# Patient Record
Sex: FEMALE | Race: WHITE | NOT HISPANIC OR LATINO | URBAN - METROPOLITAN AREA
[De-identification: names, ages, dates, MRNs, and addresses within clinical notes are randomized per-mention and may not be internally consistent; named-entity substitution may affect disease eponyms.]

---

## 2018-04-30 ENCOUNTER — OFFICE (OUTPATIENT)
Dept: URBAN - METROPOLITAN AREA CLINIC 78 | Facility: CLINIC | Age: 53
End: 2018-04-30

## 2018-04-30 VITALS
DIASTOLIC BLOOD PRESSURE: 64 MMHG | TEMPERATURE: 98.1 F | HEART RATE: 74 BPM | SYSTOLIC BLOOD PRESSURE: 119 MMHG | WEIGHT: 192 LBS | HEIGHT: 65 IN

## 2018-04-30 DIAGNOSIS — D50.9 IRON DEFICIENCY ANEMIA, UNSPECIFIED: ICD-10-CM

## 2018-04-30 DIAGNOSIS — K74.69 OTHER CIRRHOSIS OF LIVER: ICD-10-CM

## 2018-04-30 PROCEDURE — 99204 OFFICE O/P NEW MOD 45 MIN: CPT

## 2018-04-30 RX ORDER — LACTULOSE 10 G/15ML
600 SOLUTION ORAL
Qty: 1800 | Refills: 2 | Status: ACTIVE

## 2018-04-30 RX ORDER — FUROSEMIDE 20 MG/1
TABLET ORAL
Qty: 90 | Refills: 3 | Status: ACTIVE

## 2018-04-30 RX ORDER — ONDANSETRON HYDROCHLORIDE 4 MG/1
16 TABLET, FILM COATED ORAL
Qty: 45 | Refills: 2 | Status: ACTIVE
Start: 2018-04-30

## 2018-06-20 ENCOUNTER — ON CAMPUS - OUTPATIENT (OUTPATIENT)
Dept: URBAN - METROPOLITAN AREA HOSPITAL 63 | Facility: HOSPITAL | Age: 53
End: 2018-06-20
Payer: MEDICAID

## 2018-06-20 DIAGNOSIS — K76.6 PORTAL HYPERTENSION: ICD-10-CM

## 2018-06-20 DIAGNOSIS — I85.00 ESOPHAGEAL VARICES WITHOUT BLEEDING: ICD-10-CM

## 2018-06-20 DIAGNOSIS — D50.9 IRON DEFICIENCY ANEMIA, UNSPECIFIED: ICD-10-CM

## 2018-06-20 DIAGNOSIS — K74.60 UNSPECIFIED CIRRHOSIS OF LIVER: ICD-10-CM

## 2018-06-20 LAB
ANA SCREEN, IFA, W/REFL TITER AND PATTERN: ANA SCREEN, IFA: NEGATIVE
CBC (INCLUDES DIFF/PLT): ABSOLUTE BAND NEUTROPHILS: NORMAL CELLS/UL
CBC (INCLUDES DIFF/PLT): ABSOLUTE BASOPHILS: 19 CELLS/UL (ref 0–200)
CBC (INCLUDES DIFF/PLT): ABSOLUTE BLASTS: NORMAL CELLS/UL
CBC (INCLUDES DIFF/PLT): ABSOLUTE EOSINOPHILS: 38 CELLS/UL (ref 15–500)
CBC (INCLUDES DIFF/PLT): ABSOLUTE LYMPHOCYTES: 637 CELLS/UL — LOW (ref 850–3900)
CBC (INCLUDES DIFF/PLT): ABSOLUTE METAMYELOCYTES: NORMAL CELLS/UL
CBC (INCLUDES DIFF/PLT): ABSOLUTE MONOCYTES: 330 CELLS/UL (ref 200–950)
CBC (INCLUDES DIFF/PLT): ABSOLUTE MYELOCYTES: NORMAL CELLS/UL
CBC (INCLUDES DIFF/PLT): ABSOLUTE NEUTROPHILS: 2176 CELLS/UL (ref 1500–7800)
CBC (INCLUDES DIFF/PLT): ABSOLUTE NUCLEATED RBC: NORMAL CELLS/UL
CBC (INCLUDES DIFF/PLT): ABSOLUTE PROMYELOCYTES: NORMAL CELLS/UL
CBC (INCLUDES DIFF/PLT): BAND NEUTROPHILS: NORMAL %
CBC (INCLUDES DIFF/PLT): BASOPHILS: 0.6 %
CBC (INCLUDES DIFF/PLT): BLASTS: NORMAL %
CBC (INCLUDES DIFF/PLT): COMMENT(S): NORMAL
CBC (INCLUDES DIFF/PLT): EOSINOPHILS: 1.2 %
CBC (INCLUDES DIFF/PLT): HEMATOCRIT: 32.8 % — LOW (ref 35–45)
CBC (INCLUDES DIFF/PLT): HEMOGLOBIN: 10.6 G/DL — LOW (ref 11.7–15.5)
CBC (INCLUDES DIFF/PLT): LYMPHOCYTES: 19.9 %
CBC (INCLUDES DIFF/PLT): MCH: 29.1 PG (ref 27–33)
CBC (INCLUDES DIFF/PLT): MCHC: 32.3 G/DL (ref 32–36)
CBC (INCLUDES DIFF/PLT): MCV: 90.1 FL (ref 80–100)
CBC (INCLUDES DIFF/PLT): METAMYELOCYTES: NORMAL %
CBC (INCLUDES DIFF/PLT): MONOCYTES: 10.3 %
CBC (INCLUDES DIFF/PLT): MPV: 12.6 FL — HIGH (ref 7.5–12.5)
CBC (INCLUDES DIFF/PLT): MYELOCYTES: NORMAL %
CBC (INCLUDES DIFF/PLT): NEUTROPHILS: 68 %
CBC (INCLUDES DIFF/PLT): NUCLEATED RBC: NORMAL /100 WBC
CBC (INCLUDES DIFF/PLT): PLATELET COUNT: 100 THOUSAND/UL — LOW (ref 140–400)
CBC (INCLUDES DIFF/PLT): PROMYELOCYTES: NORMAL %
CBC (INCLUDES DIFF/PLT): RDW: 16.6 % — HIGH (ref 11–15)
CBC (INCLUDES DIFF/PLT): REACTIVE LYMPHOCYTES: NORMAL %
CBC (INCLUDES DIFF/PLT): RED BLOOD CELL COUNT: 3.64 MILLION/UL — LOW (ref 3.8–5.1)
CBC (INCLUDES DIFF/PLT): WHITE BLOOD CELL COUNT: 3.2 THOUSAND/UL — LOW (ref 3.8–10.8)
COMPREHENSIVE METABOLIC PANEL: ALBUMIN/GLOBULIN RATIO: 0.9 (CALC) — LOW (ref 1–2.5)
COMPREHENSIVE METABOLIC PANEL: ALBUMIN: 3.5 G/DL — LOW (ref 3.6–5.1)
COMPREHENSIVE METABOLIC PANEL: ALKALINE PHOSPHATASE: 231 U/L — HIGH (ref 33–130)
COMPREHENSIVE METABOLIC PANEL: ALT: 63 U/L — HIGH (ref 6–29)
COMPREHENSIVE METABOLIC PANEL: AST: 72 U/L — HIGH (ref 10–35)
COMPREHENSIVE METABOLIC PANEL: BILIRUBIN, TOTAL: 1.1 MG/DL (ref 0.2–1.2)
COMPREHENSIVE METABOLIC PANEL: BUN/CREATININE RATIO: (no result) (CALC)
COMPREHENSIVE METABOLIC PANEL: CALCIUM: 9.2 MG/DL (ref 8.6–10.4)
COMPREHENSIVE METABOLIC PANEL: CARBON DIOXIDE: 32 MMOL/L — HIGH (ref 20–31)
COMPREHENSIVE METABOLIC PANEL: CHLORIDE: 105 MMOL/L (ref 98–110)
COMPREHENSIVE METABOLIC PANEL: CREATININE: 0.65 MG/DL (ref 0.5–1.05)
COMPREHENSIVE METABOLIC PANEL: EGFR AFRICAN AMERICAN: 120 ML/MIN/1.73M2 (ref 60–?)
COMPREHENSIVE METABOLIC PANEL: EGFR NON-AFR. AMERICAN: 104 ML/MIN/1.73M2 (ref 60–?)
COMPREHENSIVE METABOLIC PANEL: GLOBULIN: 3.7 G/DL (CALC) (ref 1.9–3.7)
COMPREHENSIVE METABOLIC PANEL: GLUCOSE: 100 MG/DL — HIGH (ref 65–99)
COMPREHENSIVE METABOLIC PANEL: POTASSIUM: 3.5 MMOL/L (ref 3.5–5.3)
COMPREHENSIVE METABOLIC PANEL: PROTEIN, TOTAL: 7.2 G/DL (ref 6.1–8.1)
COMPREHENSIVE METABOLIC PANEL: SODIUM: 139 MMOL/L (ref 135–146)
COMPREHENSIVE METABOLIC PANEL: UREA NITROGEN (BUN): 9 MG/DL (ref 7–25)
HCV RNA, QUANTITATIVE REAL TIME PCR: 4.53 LOGIU/ML — HIGH
HCV RNA, QUANTITATIVE REAL TIME PCR: HIGH IU/ML
HEPATITIS B SURFACE ANTIGEN W/REFL CONFIRM: CONFIRMATION: NORMAL
HEPATITIS B SURFACE ANTIGEN W/REFL CONFIRM: HEPATITIS B SURFACE ANTIGEN: NORMAL
HEPATITIS C AB W/REFL TO HCV RNA, QN, PCR: HEPATITIS C ANTIBODY: REACTIVE
HEPATITIS C AB W/REFL TO HCV RNA, QN, PCR: SIGNAL TO CUT-OFF: 28.1 — HIGH (ref ?–1)
MITOCHONDRIAL ANTIBODY W/REFL TITER: MITOCHONDRIAL AB SCREEN: NEGATIVE
PARTIAL THROMBOPLASTIN TIME, ACTIVATED: 26 SEC (ref 22–34)
PROTHROMBIN TIME-INR: INR: 1.2 — HIGH
PROTHROMBIN TIME-INR: PT: 11.8 SEC — HIGH (ref 9–11.5)
SMOOTH MUSCLE AB W/REFL TITER: SMOOTH MUSCLE AB SCREEN: NEGATIVE

## 2018-06-20 PROCEDURE — 43235 EGD DIAGNOSTIC BRUSH WASH: CPT

## 2018-06-20 PROCEDURE — 45378 DIAGNOSTIC COLONOSCOPY: CPT

## 2018-07-31 ENCOUNTER — OFFICE (OUTPATIENT)
Dept: URBAN - METROPOLITAN AREA CLINIC 33 | Facility: CLINIC | Age: 53
End: 2018-07-31
Payer: MEDICAID

## 2018-07-31 VITALS
SYSTOLIC BLOOD PRESSURE: 113 MMHG | WEIGHT: 215 LBS | HEIGHT: 65 IN | TEMPERATURE: 97.5 F | DIASTOLIC BLOOD PRESSURE: 75 MMHG | HEART RATE: 99 BPM

## 2018-07-31 DIAGNOSIS — K74.69 OTHER CIRRHOSIS OF LIVER: ICD-10-CM

## 2018-07-31 DIAGNOSIS — R18.8 OTHER ASCITES: ICD-10-CM

## 2018-07-31 DIAGNOSIS — I85.00 ESOPHAGEAL VARICES WITHOUT BLEEDING: ICD-10-CM

## 2018-07-31 DIAGNOSIS — B18.2 CHRONIC VIRAL HEPATITIS C: ICD-10-CM

## 2018-07-31 PROCEDURE — 99214 OFFICE O/P EST MOD 30 MIN: CPT

## 2018-07-31 RX ORDER — SPIRONOLACTONE 50 MG/1
TABLET, FILM COATED ORAL
Qty: 90 | Refills: 3 | Status: ACTIVE

## 2018-07-31 RX ORDER — FUROSEMIDE 20 MG/1
TABLET ORAL
Qty: 90 | Refills: 3 | Status: ACTIVE

## 2018-07-31 NOTE — SERVICEHPINOTES
53 yo female presents for f/u cirrhosis likely due to both Hep C and diabetes. She is being followed by hematology due to anemia requiring blood transfusions again recently - done at [facility]. Recent EGD showed grade III varices, banded (not bleeding) with 1- month f/u advised. She is on propranolol 20 mg BID though her HR today is above goal. Her recent colonoscopy showed diverticulosis only.Reports paracentesis recently at [facility] - including fluid studies. Reports no infection. Ascites has been much worse with very distended abdomen. She is on Lasix 80 mg and spironolactone 50 mg. She has been using NSAIDs or topical treatments for pain in various places. 6/11/18 Hgb 10.6, MCV 90, plt 100, creat 0.65, bili 1.1, alb 3.5, AST/ALT 72/63, alk phos 231, INR 1.2, Hep C ab pos, HCV RNA 33,700 IU/ml, HBsAg neg

## 2018-09-17 ENCOUNTER — ON CAMPUS - OUTPATIENT (OUTPATIENT)
Dept: URBAN - METROPOLITAN AREA HOSPITAL 35 | Facility: HOSPITAL | Age: 53
End: 2018-09-17
Payer: MEDICAID

## 2018-09-17 DIAGNOSIS — I85.00 ESOPHAGEAL VARICES WITHOUT BLEEDING: ICD-10-CM

## 2018-09-17 PROCEDURE — 43235 EGD DIAGNOSTIC BRUSH WASH: CPT | Mod: 53
